# Patient Record
Sex: FEMALE | Race: AMERICAN INDIAN OR ALASKA NATIVE
[De-identification: names, ages, dates, MRNs, and addresses within clinical notes are randomized per-mention and may not be internally consistent; named-entity substitution may affect disease eponyms.]

---

## 2018-05-15 ENCOUNTER — HOSPITAL ENCOUNTER (EMERGENCY)
Dept: HOSPITAL 5 - ED | Age: 33
Discharge: HOME | End: 2018-05-15
Payer: SELF-PAY

## 2018-05-15 VITALS — DIASTOLIC BLOOD PRESSURE: 70 MMHG | SYSTOLIC BLOOD PRESSURE: 100 MMHG

## 2018-05-15 DIAGNOSIS — Z3A.15: ICD-10-CM

## 2018-05-15 DIAGNOSIS — O20.0: Primary | ICD-10-CM

## 2018-05-15 LAB
ALBUMIN SERPL-MCNC: 3.6 G/DL (ref 3.9–5)
ALT SERPL-CCNC: 10 UNITS/L (ref 7–56)
BASOPHILS # (AUTO): 0 K/MM3 (ref 0–0.1)
BASOPHILS NFR BLD AUTO: 0.2 % (ref 0–1.8)
BILIRUB UR QL STRIP: (no result)
BLOOD UR QL VISUAL: (no result)
BUN SERPL-MCNC: 7 MG/DL (ref 7–17)
BUN/CREAT SERPL: 18 %
CALCIUM SERPL-MCNC: 8.9 MG/DL (ref 8.4–10.2)
EOSINOPHIL # BLD AUTO: 0.1 K/MM3 (ref 0–0.4)
EOSINOPHIL NFR BLD AUTO: 1.5 % (ref 0–4.3)
HCT VFR BLD CALC: 36.2 % (ref 30.3–42.9)
HEMOLYSIS INDEX: 9
HGB BLD-MCNC: 11.8 GM/DL (ref 10.1–14.3)
HGB S BLD QL SOLY: (no result)
LYMPHOCYTES # BLD AUTO: 1.3 K/MM3 (ref 1.2–5.4)
LYMPHOCYTES NFR BLD AUTO: 21.1 % (ref 13.4–35)
MCH RBC QN AUTO: 28 PG (ref 28–32)
MCHC RBC AUTO-ENTMCNC: 33 % (ref 30–34)
MCV RBC AUTO: 85 FL (ref 79–97)
MONOCYTES # (AUTO): 0.3 K/MM3 (ref 0–0.8)
MONOCYTES % (AUTO): 5.2 % (ref 0–7.3)
MUCOUS THREADS #/AREA URNS HPF: (no result) /HPF
PH UR STRIP: 7 [PH] (ref 5–7)
PLATELET # BLD: 186 K/MM3 (ref 140–440)
PROT UR STRIP-MCNC: (no result) MG/DL
RBC # BLD AUTO: 4.28 M/MM3 (ref 3.65–5.03)
RBC #/AREA URNS HPF: < 1 /HPF (ref 0–6)
UROBILINOGEN UR-MCNC: < 2 MG/DL (ref ?–2)
WBC #/AREA URNS HPF: < 1 /HPF (ref 0–6)

## 2018-05-15 PROCEDURE — 86900 BLOOD TYPING SEROLOGIC ABO: CPT

## 2018-05-15 PROCEDURE — 85025 COMPLETE CBC W/AUTO DIFF WBC: CPT

## 2018-05-15 PROCEDURE — 81001 URINALYSIS AUTO W/SCOPE: CPT

## 2018-05-15 PROCEDURE — 80053 COMPREHEN METABOLIC PANEL: CPT

## 2018-05-15 PROCEDURE — 86901 BLOOD TYPING SEROLOGIC RH(D): CPT

## 2018-05-15 PROCEDURE — 86850 RBC ANTIBODY SCREEN: CPT

## 2018-05-15 PROCEDURE — 84702 CHORIONIC GONADOTROPIN TEST: CPT

## 2018-05-15 PROCEDURE — 36415 COLL VENOUS BLD VENIPUNCTURE: CPT

## 2018-05-15 PROCEDURE — 76805 OB US >/= 14 WKS SNGL FETUS: CPT

## 2018-05-15 NOTE — EMERGENCY DEPARTMENT REPORT
Blank Doc





- Documentation


Documentation: 





Patient is a  who presents to emergency department with vaginal spotting 

and abdominal pain.  Patient states that she is approximately 3 months 

pregnant.  Patient denies any vaginal discharge.  Patient denies any trauma, 

increase activity, or recent sexual activity.  Care will be turned over to the 

[facility]

## 2018-05-15 NOTE — ULTRASOUND REPORT
FINAL REPORT



EXAM:  US OB &gt; = 14 WEEKS FETUS



HISTORY:  bleeding 



TECHNIQUE:  Ultrasound obstetrical transabdominal 



PRIORS:  None.



FINDINGS:  

Single live intrauterine gestation present. Cardiac activity

present with fetal heart rate of 132 beats per minute 



Cervix is 3.6 centimeters in length 



Amniotic fluid appears within normal limits 



The placenta is right lateral and there is no evidence for

abruption. Placenta is not low lying 



Fetal biometric measurements were obtained 



Biparietal diameter 16 weeks 0 days 



Head circumference 15 weeks 6 days 



Abdominal circumference 15 weeks 0 days 



Femur length 15 weeks 6 days 



Based on today's exam estimated gestational age is 15 weeks 5

days with estimated date of delivery November 1, 2018 



Noted is a shadowing echogenic focus within the myometrium

consistent with a calcified uterine fibroid. 



IMPRESSION:  

Single live intrauterine gestation estimated at 15 weeks 5 days 



No evidence for placental abruption 



Calcified uterine fibroid noted

## 2018-05-15 NOTE — EMERGENCY DEPARTMENT REPORT
ED Female  HPI





- General


Chief complaint: Abdominal Pain


Stated complaint: SPOTTING BLOOD/ABD PAIN


Time Seen by Provider: 05/15/18 15:40


Source: patient


Mode of arrival: Ambulatory


Limitations: No Limitations





- History of Present Illness


Initial comments: 





Patient is a 33-year-old  at approximately 15 weeks gestation presents to 

ED complaining of vaginal spotting that started today.  Patient also admits 

some low pelvic cramping.  Patient states she has not seen OB/GYN this 

pregnancy.  Denies any trauma, intercourse, vaginal discharge or any other 

problems.  She denies fevers/chills/nausea vomiting.


MD Complaint: vaginal bleeding





- Related Data


 Previous Rx's











 Medication  Instructions  Recorded  Last Taken  Type


 


Acetaminophen [Acetaminophen  mg PO Q8HR PRN #30 tablet.er 05/15/18 

Unknown Rx





TAB]    











 Allergies











Allergy/AdvReac Type Severity Reaction Status Date / Time


 


No Known Allergies Allergy   Unverified 05/15/18 13:25














ED Review of Systems


ROS: 


Stated complaint: SPOTTING BLOOD/ABD PAIN


Other details as noted in HPI





Constitutional: denies: chills, fever


Eyes: denies: eye pain, eye discharge, vision change


ENT: denies: ear pain, throat pain


Respiratory: denies: cough, shortness of breath, wheezing


Cardiovascular: denies: chest pain, palpitations


Endocrine: no symptoms reported


Gastrointestinal: denies: abdominal pain, nausea, vomiting, diarrhea


Genitourinary: denies: urgency, dysuria, discharge


Musculoskeletal: denies: back pain, joint swelling, arthralgia


Skin: denies: rash, lesions


Neurological: denies: headache, weakness, paresthesias


Psychiatric: denies: anxiety, depression


Hematological/Lymphatic: denies: easy bleeding, easy bruising





ED Past Medical Hx





- Past Medical History


Previous Medical History?: No





- Surgical History


Past Surgical History?: No





- Social History


Smoking Status: Never Smoker


Substance Use Type: None





- Medications


Home Medications: 


 Home Medications











 Medication  Instructions  Recorded  Confirmed  Last Taken  Type


 


Acetaminophen [Acetaminophen  mg PO Q8HR PRN #30 tablet.er 05/15/18  

Unknown Rx





TAB]     














ED Physical Exam





- General


Limitations: No Limitations


General appearance: alert, in no apparent distress





- Head


Head exam: Present: atraumatic, normocephalic





- Eye


Eye exam: Present: normal appearance





- ENT


ENT exam: Present: mucous membranes moist





- Neck


Neck exam: Present: normal inspection





- Respiratory


Respiratory exam: Present: normal lung sounds bilaterally.  Absent: respiratory 

distress





- Cardiovascular


Cardiovascular Exam: Present: regular rate, normal rhythm.  Absent: systolic 

murmur, diastolic murmur, rubs, gallop





- GI/Abdominal


GI/Abdominal exam: Present: soft, normal bowel sounds





- Extremities Exam


Extremities exam: Present: normal inspection





- Back Exam


Back exam: Present: normal inspection





- Neurological Exam


Neurological exam: Present: alert, oriented X3





- Psychiatric


Psychiatric exam: Present: normal affect, normal mood





- Skin


Skin exam: Present: warm, dry, intact, normal color.  Absent: rash





ED Course


 Vital Signs











  05/15/18





  13:21


 


Temperature 98.6 F


 


Pulse Rate 98 H


 


Respiratory 16





Rate 


 


Blood Pressure 100/70


 


O2 Sat by Pulse 100





Oximetry 














ED Medical Decision Making





- Lab Data


Result diagrams: 


 05/15/18 16:01





 05/15/18 16:01





- Radiology Data


Radiology results: report reviewed, image reviewed





FINAL REPORT 





EXAM: US OB gt; = 14 WEEKS FETUS 





HISTORY: bleeding 





TECHNIQUE: Ultrasound obstetrical transabdominal 





PRIORS: None. 





FINDINGS: 


Single live intrauterine gestation present. Cardiac activity 


present with fetal heart rate of 132 beats per minute 





Cervix is 3.6 centimeters in length 





Amniotic fluid appears within normal limits 





The placenta is right lateral and there is no evidence for 


abruption. Placenta is not low lying 





Fetal biometric measurements were obtained 





Biparietal diameter 16 weeks 0 days 





Head circumference 15 weeks 6 days 





Abdominal circumference 15 weeks 0 days 





Femur length 15 weeks 6 days 





Based on today's exam estimated gestational age is 15 weeks 5 


days with estimated date of delivery 2018 





Noted is a shadowing echogenic focus within the myometrium 


consistent with a calcified uterine fibroid. 





IMPRESSION: 


Single live intrauterine gestation estimated at 15 weeks 5 days 





No evidence for placental abruption 





Calcified uterine fibroid noted 











Transcribed By: BENJY 


Dictated By: VENKATA FAY MD 


Electronically Authenticated By: VENKATA FAY MD 


Signed Date/Time: 05/15/18 1806 











- Medical Decision Making


33-year-old female presents to ED with threatened 


ED course: Pt received ultra sound, CBC, urinalysis, pregnancy test and 

quantitative ED


All labs within normal limits,


 pregnancy quantitative elevated matching pregnancy gestation age


Patient states that bleeding is resolved when she is went into the bathroom to 

check


Discussed the patient she will need to follow up with OB/GYN for prenatal care.


Ultrasound shows IUP gestation at 16 weeks with 132 bpm,  Cervical length 3cm. 

See reported above


Vital signs normalized patient is in no acute distress.


I discussed with the patient if follow-up with her OB/GYN.


I discussed all labs and ultrasound findings with the patient.


Discussed patient to increase hydration and take the prenatal vitamins.


Discussed pelvic rest and no intercourse until follow-up with OB/GYN.


I discussed with the patient that he if bleeding worsens or new symptoms 

develop to return to ED immediately











Critical care attestation.: 


If time is entered above; I have spent that time in minutes in the direct care 

of this critically ill patient, excluding procedure time.








ED Disposition


Clinical Impression: 


 Threatened  in second trimester





Normal IUP (intrauterine pregnancy) on prenatal ultrasound


Qualifiers:


 Trimester: second trimester Qualified Code(s): Z34.92 - Encounter for 

supervision of normal pregnancy, unspecified, second trimester





Disposition:  TO HOME OR SELFCARE


Is pt being admited?: No


Does the pt Need Aspirin: No


Condition: Stable


Instructions:  Abdominal Pain (ED)


Additional Instructions: 


Make sure to follow up with the primary care physician as discussed.


Take all your medications as you've been prescribed.


If you have any worsening symptoms or develop new symptoms please return to ED 

immediately.


Prescriptions: 


Acetaminophen [Acetaminophen ER TAB] 650 mg PO Q8HR PRN #30 tablet.er


 PRN Reason: Pain


Referrals: 


PRIMARY CARE,MD [Primary Care Provider] - 3-5 Days


FAHEEM MELLO MD [Staff Physician] - 3-5 Days


WAGNER MELLO MD [Referring] - 3-5 Days


[facility] [Provider Group] - 3-5 Days


[facility] [Provider Group] - 3-5 Days


Forms:  Accompanied Note, Work/School Release Form(ED)


Time of Disposition: 18:08